# Patient Record
Sex: MALE | Race: WHITE | Employment: OTHER
[De-identification: names, ages, dates, MRNs, and addresses within clinical notes are randomized per-mention and may not be internally consistent; named-entity substitution may affect disease eponyms.]

---

## 2022-03-19 PROBLEM — F17.210 NICOTINE DEPENDENCE, CIGARETTES, UNCOMPLICATED: Status: ACTIVE | Noted: 2018-01-10

## 2022-03-19 PROBLEM — H69.92 CHRONIC DYSFUNCTION OF LEFT EUSTACHIAN TUBE: Status: ACTIVE | Noted: 2018-01-10

## 2022-03-19 PROBLEM — B08.1 MOLLUSCA CONTAGIOSA: Status: ACTIVE | Noted: 2018-01-10

## 2022-03-19 PROBLEM — F12.90 MARIJUANA USE: Status: ACTIVE | Noted: 2018-01-31

## 2022-03-19 PROBLEM — H69.82 CHRONIC DYSFUNCTION OF LEFT EUSTACHIAN TUBE: Status: ACTIVE | Noted: 2018-01-10

## 2022-03-20 PROBLEM — E78.2 MIXED HYPERLIPIDEMIA: Status: ACTIVE | Noted: 2018-01-10

## 2023-10-10 ENCOUNTER — OFFICE VISIT (OUTPATIENT)
Dept: INTERNAL MEDICINE CLINIC | Facility: CLINIC | Age: 58
End: 2023-10-10
Payer: MEDICAID

## 2023-10-10 VITALS
DIASTOLIC BLOOD PRESSURE: 70 MMHG | WEIGHT: 167 LBS | SYSTOLIC BLOOD PRESSURE: 118 MMHG | HEART RATE: 76 BPM | BODY MASS INDEX: 23.91 KG/M2 | HEIGHT: 70 IN

## 2023-10-10 DIAGNOSIS — Z23 ENCOUNTER FOR IMMUNIZATION: ICD-10-CM

## 2023-10-10 DIAGNOSIS — Z12.5 PROSTATE CANCER SCREENING: ICD-10-CM

## 2023-10-10 DIAGNOSIS — M15.9 PRIMARY OSTEOARTHRITIS INVOLVING MULTIPLE JOINTS: ICD-10-CM

## 2023-10-10 DIAGNOSIS — Z53.20 LUNG CANCER SCREENING DECLINED BY PATIENT: ICD-10-CM

## 2023-10-10 DIAGNOSIS — E78.2 HYPERLIPIDEMIA, MIXED: ICD-10-CM

## 2023-10-10 DIAGNOSIS — L98.9 SKIN LESIONS: ICD-10-CM

## 2023-10-10 DIAGNOSIS — Z72.0 TOBACCO ABUSE: ICD-10-CM

## 2023-10-10 DIAGNOSIS — Z00.00 ENCOUNTER FOR PREVENTIVE HEALTH EXAMINATION: Chronic | ICD-10-CM

## 2023-10-10 DIAGNOSIS — K63.5 POLYP OF COLON, UNSPECIFIED PART OF COLON, UNSPECIFIED TYPE: ICD-10-CM

## 2023-10-10 PROBLEM — F12.90 MARIJUANA USE: Status: RESOLVED | Noted: 2018-01-31 | Resolved: 2023-10-10

## 2023-10-10 PROBLEM — H69.92 CHRONIC DYSFUNCTION OF LEFT EUSTACHIAN TUBE: Status: RESOLVED | Noted: 2018-01-10 | Resolved: 2023-10-10

## 2023-10-10 PROBLEM — B08.1 MOLLUSCA CONTAGIOSA: Status: RESOLVED | Noted: 2018-01-10 | Resolved: 2023-10-10

## 2023-10-10 PROBLEM — H69.82 CHRONIC DYSFUNCTION OF LEFT EUSTACHIAN TUBE: Status: RESOLVED | Noted: 2018-01-10 | Resolved: 2023-10-10

## 2023-10-10 PROCEDURE — 99396 PREV VISIT EST AGE 40-64: CPT | Performed by: INTERNAL MEDICINE

## 2023-10-10 SDOH — ECONOMIC STABILITY: FOOD INSECURITY: WITHIN THE PAST 12 MONTHS, YOU WORRIED THAT YOUR FOOD WOULD RUN OUT BEFORE YOU GOT MONEY TO BUY MORE.: NEVER TRUE

## 2023-10-10 SDOH — ECONOMIC STABILITY: HOUSING INSECURITY
IN THE LAST 12 MONTHS, WAS THERE A TIME WHEN YOU DID NOT HAVE A STEADY PLACE TO SLEEP OR SLEPT IN A SHELTER (INCLUDING NOW)?: NO

## 2023-10-10 SDOH — ECONOMIC STABILITY: INCOME INSECURITY: HOW HARD IS IT FOR YOU TO PAY FOR THE VERY BASICS LIKE FOOD, HOUSING, MEDICAL CARE, AND HEATING?: NOT HARD AT ALL

## 2023-10-10 SDOH — ECONOMIC STABILITY: FOOD INSECURITY: WITHIN THE PAST 12 MONTHS, THE FOOD YOU BOUGHT JUST DIDN'T LAST AND YOU DIDN'T HAVE MONEY TO GET MORE.: NEVER TRUE

## 2023-10-10 ASSESSMENT — PATIENT HEALTH QUESTIONNAIRE - PHQ9
2. FEELING DOWN, DEPRESSED OR HOPELESS: 0
SUM OF ALL RESPONSES TO PHQ QUESTIONS 1-9: 0
SUM OF ALL RESPONSES TO PHQ9 QUESTIONS 1 & 2: 0
1. LITTLE INTEREST OR PLEASURE IN DOING THINGS: 0
SUM OF ALL RESPONSES TO PHQ QUESTIONS 1-9: 0

## 2023-10-10 ASSESSMENT — ENCOUNTER SYMPTOMS
RECTAL PAIN: 0
STRIDOR: 0
EYE PAIN: 0
VOICE CHANGE: 0
CHOKING: 0

## 2023-10-10 NOTE — PROGRESS NOTES
NEW PATIENT VISIT    Subjective:    Mr. Lucian Killian is a 59 y.o., male,   Chief Complaint   Patient presents with    [facility]       HPI:    Mr. Lucian Killian is a 59 y.o., male who presents today for a new patient appointment. Their previous primary provider was [facility]. Old records have been reviewed. He has a history of skin cancer excised from his nose years ago. He has several lesions he would like me to evaluate. Has seen dermatology Dr Mariangel Dillon in the remote past.      He has been a long time smoker. Over the course of his life he estimates 3/4 ppd x 40 years. He has had colon polyps in the past.  2018 colonoscopy - 10+ polyps removed. They recommended repeat in 6 months. Patient refused to return. \"I just didn't think it was necessary after they removed them all. \"      The following portions of the patient's history were reviewed and updated as appropriate:      Past Medical History:   Diagnosis Date    Colon polyps     Hyperlipidemia     Mollusca contagiosa 01/10/2018    Osteoarthritis     Tobacco abuse        Past Surgical History:   Procedure Laterality Date    SKIN CANCER EXCISION         Family History   Problem Relation Age of Onset    Heart Disease Mother     No Known Problems Father     Heart Disease Maternal Aunt        Social History     Socioeconomic History    Marital status:      Spouse name: Not on file    Number of children: 4    Years of education: Not on file    Highest education level: Not on file   Occupational History     Comment: construction /    Tobacco Use    Smoking status: Every Day     Packs/day: 0.75     Years: 40.00     Additional pack years: 0.00     Total pack years: 30.00     Types: Cigarettes    Smokeless tobacco: Never   Substance and Sexual Activity    Alcohol use: Not Currently    Drug use: No    Sexual activity: Not on file   Other Topics Concern    Not on file   Social History Narrative    Not on file     Social Determinants of

## 2023-10-23 ENCOUNTER — CLINICAL DOCUMENTATION (OUTPATIENT)
Dept: SURGERY | Age: 58
End: 2023-10-23

## 2023-10-23 NOTE — PROGRESS NOTES
Referral received for routine colonoscopy- screening or surveillance only. HX POLYPS  Chart reviewed by me on October 23, 2023. Polyp x 12 6/22/18 at [facility]; did not return for advised 6-month exam.  Path not located.     Pt found to be acceptable candidate for direct scheduling if they are interested with the following special instructions (if any):

## 2023-11-09 PROBLEM — Z12.5 PROSTATE CANCER SCREENING: Status: RESOLVED | Noted: 2023-10-10 | Resolved: 2023-11-09
